# Patient Record
Sex: MALE | Race: BLACK OR AFRICAN AMERICAN | URBAN - METROPOLITAN AREA
[De-identification: names, ages, dates, MRNs, and addresses within clinical notes are randomized per-mention and may not be internally consistent; named-entity substitution may affect disease eponyms.]

---

## 2017-07-09 ENCOUNTER — HOSPITAL ENCOUNTER (EMERGENCY)
Facility: CLINIC | Age: 27
Discharge: HOME | End: 2017-07-09
Attending: EMERGENCY MEDICINE | Admitting: EMERGENCY MEDICINE

## 2017-07-09 ENCOUNTER — APPOINTMENT (OUTPATIENT)
Dept: GENERAL RADIOLOGY | Facility: CLINIC | Age: 27
End: 2017-07-09
Attending: EMERGENCY MEDICINE

## 2017-07-09 VITALS
SYSTOLIC BLOOD PRESSURE: 126 MMHG | RESPIRATION RATE: 16 BRPM | OXYGEN SATURATION: 98 % | DIASTOLIC BLOOD PRESSURE: 82 MMHG | TEMPERATURE: 98.5 F

## 2017-07-09 DIAGNOSIS — S86.302A INJURY OF PERONEAL TENDON OF LEFT FOOT, INITIAL ENCOUNTER: ICD-10-CM

## 2017-07-09 PROCEDURE — 99283 EMERGENCY DEPT VISIT LOW MDM: CPT

## 2017-07-09 PROCEDURE — 25000132 ZZH RX MED GY IP 250 OP 250 PS 637: Performed by: EMERGENCY MEDICINE

## 2017-07-09 PROCEDURE — 73610 X-RAY EXAM OF ANKLE: CPT | Mod: LT

## 2017-07-09 RX ORDER — IBUPROFEN 600 MG/1
600 TABLET, FILM COATED ORAL ONCE
Status: COMPLETED | OUTPATIENT
Start: 2017-07-09 | End: 2017-07-09

## 2017-07-09 RX ORDER — IBUPROFEN 200 MG
600 TABLET ORAL EVERY 8 HOURS PRN
Qty: 30 TABLET | Refills: 0 | Status: SHIPPED | OUTPATIENT
Start: 2017-07-09

## 2017-07-09 RX ADMIN — IBUPROFEN 600 MG: 600 TABLET ORAL at 07:16

## 2017-07-09 ASSESSMENT — ENCOUNTER SYMPTOMS: JOINT SWELLING: 1

## 2017-07-09 NOTE — LETTER
[facility] EMERGENCY DEPARTMENT  [address]  [facility] [address]  [phone]    2017    Kenan Barroso  No address on file.  There are no phone numbers on file.    : 1990      To Whom it may concern:    Kenan Barroso was seen in our Emergency Department today, 2017.  I expect his condition to improve over the next 7 days.  He may return to work/school when approved by his orthopedic doctor.    Sincerely,        Jam Becerril MD

## 2017-07-09 NOTE — ED AVS SNAPSHOT
[facility] Emergency Department    [address]    [facility] [address]    Phone:  [phone]    Fax:  [phone]                                       Kenan Barroso   MRN: [id number]    Department:  [facility] Emergency Department   Date of Visit:  7/9/2017           After Visit Summary Signature Page     I have received my discharge instructions, and my questions have been answered. I have discussed any challenges I see with this plan with the nurse or doctor.    ..........................................................................................................................................  Patient/Patient Representative Signature      ..........................................................................................................................................  Patient Representative Print Name and Relationship to Patient    ..................................................               ................................................  Date                                            Time    ..........................................................................................................................................  Reviewed by Signature/Title    ...................................................              ..............................................  Date                                                            Time

## 2017-07-09 NOTE — DISCHARGE INSTRUCTIONS
You have a Peroneal tendon dislocation or the left ankle.  You will need to keep the ortho boot on continuously- no walking without it, and crutches for non weightbearing until further evaluated by the orthopedist.  Call the ortho office to whom we referred you to in these discharge instructions and set up an appointment to see them this week.  Tell them that the ER doctor wanted you to be seen this week.  Until then, hand in your work release slip to be off this week to elevate, ice, and rest your ankle and foot.  Use Ibuprofen 600 mg every 8 hours with some food to prevent an upset stomach.  You may return if you develop new or worsening symptoms prior to seeing your orthopedist.

## 2017-07-09 NOTE — ED AVS SNAPSHOT
[facility] Emergency Department    [address]    [facility] [address]    Phone:  [phone]    Fax:  [phone]                                       Kenan Barroso   MRN: [id number]    Department:  [facility] Emergency Department   Date of Visit:  7/9/2017           Patient Information     Date Of Birth          1990        Your diagnoses for this visit were:     Injury of peroneal tendon of left foot, initial encounter        You were seen by Jam Becerril MD.      Follow-up Information     Schedule an appointment as soon as possible for a visit with Keyshawn Hamilton MD.    Specialty:  Orthopedics    Contact information:    [facility] ORTHOPEDICS  [address]  [facility] [address] [phone]          Discharge Instructions       You have a Peroneal tendon dislocation or the left ankle.  You will need to keep the ortho boot on continuously- no walking without it, and crutches for non weightbearing until further evaluated by the orthopedist.  Call the ortho office to whom we referred you to in these discharge instructions and set up an appointment to see them this week.  Tell them that the ER doctor wanted you to be seen this week.  Until then, hand in your work release slip to be off this week to elevate, ice, and rest your ankle and foot.  Use Ibuprofen 600 mg every 8 hours with some food to prevent an upset stomach.  You may return if you develop new or worsening symptoms prior to seeing your orthopedist.    24 Hour Appointment Hotline       To make an appointment at any [facility] clinic, call [phone] ([phone]). If you don't have a family doctor or clinic, we will help you find one. [facility] clinics are conveniently located to serve the needs of you and your family.             Review of your medicines      START taking        Dose / Directions Last dose taken    ibuprofen 200 MG tablet   Commonly known as:   ADVIL/MOTRIN   Dose:  600 mg   Quantity:  30 tablet        Take 3 tablets (600 mg) by mouth every 8 hours as needed for mild pain   Refills:  0                Prescriptions were sent or printed at these locations (1 Prescription)                   Other Prescriptions                Printed at Department/Unit printer (1 of 1)         ibuprofen (ADVIL/MOTRIN) 200 MG tablet                Procedures and tests performed during your visit     Ankle XR, G/E 3 views, left      Orders Needing Specimen Collection     None      Pending Results     No orders found from 7/7/2017 to 7/10/2017.            Pending Culture Results     No orders found from 7/7/2017 to 7/10/2017.            Pending Results Instructions     If you had any lab results that were not finalized at the time of your Discharge, you can call the ED Lab Result RN at [phone]. You will be contacted by this team for any positive Lab results or changes in treatment. The nurses are available 7 days a week from 10A to 6:30P.  You can leave a message 24 hours per day and they will return your call.        Test Results From Your Hospital Stay        7/9/2017  7:43 AM      Narrative     XR ANKLE LT G/E 3 VW 7/9/2017 7:34 AM    HISTORY: Trauma.    COMPARISON: None.        Impression     IMPRESSION: No evidence of acute fracture or malalignment. Ankle  mortise is intact. Mild soft tissue swelling overlying the lateral  malleolus.    MAXIME RAMON MD                Clinical Quality Measure: Blood Pressure Screening     Your blood pressure was checked while you were in the emergency department today. The last reading we obtained was  BP: 126/82 . Please read the guidelines below about what these numbers mean and what you should do about them.  If your systolic blood pressure (the top number) is less than 120 and your diastolic blood pressure (the bottom number) is less than 80, then your blood pressure is normal. There is nothing more that you need to do about it.  If  "your systolic blood pressure (the top number) is 120-139 or your diastolic blood pressure (the bottom number) is 80-89, your blood pressure may be higher than it should be. You should have your blood pressure rechecked within a year by a primary care provider.  If your systolic blood pressure (the top number) is 140 or greater or your diastolic blood pressure (the bottom number) is 90 or greater, you may have high blood pressure. High blood pressure is treatable, but if left untreated over time it can put you at risk for heart attack, stroke, or kidney failure. You should have your blood pressure rechecked by a primary care provider within the next 4 weeks.  If your provider in the emergency department today gave you specific instructions to follow-up with your doctor or provider even sooner than that, you should follow that instruction and not wait for up to 4 weeks for your follow-up visit.        Thank you for choosing [facility]       Thank you for choosing [facility] for your care. Our goal is always to provide you with excellent care. Hearing back from our patients is one way we can continue to improve our services. Please take a few minutes to complete the written survey that you may receive in the mail after you visit with us. Thank you!        Symptom.ly Information     Symptom.ly lets you send messages to your doctor, view your test results, renew your prescriptions, schedule appointments and more. To sign up, go to [email or web address] . Click on \"Log in\" on the left side of the screen, which will take you to the Welcome page. Then click on \"Sign up Now\" on the right side of the page.     You will be asked to enter the access code listed below, as well as some personal information. Please follow the directions to create your username and password.     Your access code is: [email or web address]  Expires: 10/7/2017  8:02 AM     Your access code will  in 90 days. If you need help or a new code, please call your " [facility] or [phone].        Care EveryWhere ID     This is your Care EveryWhere ID. This could be used by other organizations to access your [facility] medical records  [id number]        Equal Access to Services     CARINE ALANIZ : Gretchen [place], waaxda luqadaha, qaybta kaalmada darlin, anna hernandez. So [facility] [phone].    ATENCIÓN: Si habla español, tiene a etienne disposición servicios gratuitos de asistencia lingüística. Llame al [phone].    We comply with applicable federal civil rights laws and [place] laws. We do not discriminate on the basis of race, color, national origin, age, disability sex, sexual orientation or gender identity.            After Visit Summary       This is your record. Keep this with you and show to your community pharmacist(s) and doctor(s) at your next visit.

## 2017-07-09 NOTE — ED PROVIDER NOTES
History     Chief Complaint:  Ankle pain     HPI   Kenan Barroso is a 26 year old male who presents with left ankle pain. The patient states that he was hit by a palate sharon at work sustaining an injury to his left ankle yesterday at 2100 with slight swelling. The patient states he feels a clicking sensation with walking and certain rotations, prompting his ED visit.     Allergies:  NKDA     Medications:    The patient is currently on no regular medications.      Past Medical History:    The patient denies any significant past medical history.    Past Surgical History:    The patient does not have any pertinent past surgical history  Family History:    No past pertinent family history.    Social History:  Presents with his significant other.  Current Every Day Smoker - 0.25 ppd  Negative for alcohol use.      Review of Systems   Musculoskeletal: Positive for gait problem and joint swelling.   All other systems reviewed and are negative.    Physical Exam   First Vitals:  BP: 126/82  Heart Rate: 60  Temp: 98.5  F (36.9  C)  Resp: 16  SpO2: 98 %    Physical Exam  General: Generally comfortable lying down, discomfort with walking and movement of left ankle.  HEENT:   The scalp and head appear normal    The pupils are equal, round, and reactive to light    Extraocular muscles are intact.    The nose is normal.    The oropharynx is normal.      Uvula is in the midline.  There is no peritonsillar abscess.  Neck:  Normal range of motion.    Lungs:  Clear.      No rales, no wheezing.      There is no tachypnea.  Non-labored.  Cardiac: Regular rate.      Normal S1 and S2.      No S3 or S4.      No pathological murmur.      No pericardial rub.  Abdomen: Soft. No distension. No tympani. No rebound. Non-tender.  Lymph: No anterior or posterior cervical lymphadenopathy noted.  MS:  Normal tone.      With plantar flexion of the left foot, the peroneus longus tendon comes out of its   sheath     Peroneus longus tendon came  out of it sheath clicking along distal fibula along   left ankle  Neuro:  Normal mentation.  No focal motor or sensory changes.      Speech normal.  Psych:  Awake.     Alert.      Normal affect.      Appropriate interactions.  Skin:  No rash.      No lesions.       Emergency Department Course     Imaging:  Radiographic findings were communicated with the patient who voiced understanding of the findings.  XR Ankle, Left, G/E 3 views:   No evidence of acute fracture or malalignment. Ankle  mortise is intact. Mild soft tissue swelling overlying the lateral  malleolus. As per radiology.     Interventions:  0716 Advil/Motrin, 600 mg, PO     Emergency Department Course:  Nursing notes and vitals reviewed. I performed an exam of the patient as documented above.     Medicine administered as documented above.     The patient was sent for a left foot XR while in the emergency department, findings above.     0720 I rechecked the patient and discussed the results of their workup thus far.      Findings and plan explained to the Patient. Patient discharged home with instructions regarding supportive care, medications, and reasons to return. The importance of close follow-up was reviewed. The patient was prescribed Advil.       Impression & Plan      Medical Decision Making:  Kenan Barroso is a 26 year old male who dislocated his peroneus tendon left ankle foot area. It audible and visible snaps and moves with plantar flexion. This was caused by direct impact by a palate sharon at work. He works at [employer].    The patient has moderately severe pain on plantar flexion as this tendon moves. He also has moderate swelling present at that area and is very tender to palpation at that area.   We're going to immobilize his foot and ankle in an ortho air boot. He was instructed to keep this on at all times when he is up right and walking. We should have him, at least over the next several days, non weight bearing with  crutches. He has been instructed, over the next 48-72 hours, to elevate this above his heart. Ice this area 4 times a day for 15 minutes and take the ibuprofen as prescribed. He was given a work note to be off this week so that he may follow up to have this treated appropriately by [facility] who is on call for the ED. He has been given this information and instructed in this regard. Also, I gave him appropriate doses of ibuprofen on a prescription.     Impression: Acute traumatic injury to the left peroneus tendon with dislocation.     Diagnosis:    ICD-10-CM   1. Injury of peroneal tendon of left foot, initial encounter S86.302A       Disposition:  discharged to home    Discharge Medications:  New Prescriptions    IBUPROFEN (ADVIL/MOTRIN) 200 MG TABLET    Take 3 tablets (600 mg) by mouth every 8 hours as needed for mild pain     I, Fariha Rangel, am serving as a scribe on 7/9/2017 at 7:06 AM to personally document services performed by Jam Becerril MD based on my observations and the provider's statements to me.      Fariha Rangel  7/9/2017   [facility] EMERGENCY DEPARTMENT       Jam Becerril MD  07/09/17 0846

## 2017-07-09 NOTE — ED NOTES
"Patient reports being injured at work, palate hit back of L ankle. Reports \" clicking sensation\"  "

## 2017-08-08 ENCOUNTER — HOSPITAL ENCOUNTER (EMERGENCY)
Facility: CLINIC | Age: 27
Discharge: HOME | End: 2017-08-08
Attending: INTERNAL MEDICINE | Admitting: INTERNAL MEDICINE
Payer: COMMERCIAL

## 2017-08-08 VITALS
OXYGEN SATURATION: 100 % | RESPIRATION RATE: 16 BRPM | DIASTOLIC BLOOD PRESSURE: 84 MMHG | SYSTOLIC BLOOD PRESSURE: 108 MMHG | TEMPERATURE: 98.4 F

## 2017-08-08 PROCEDURE — 99282 EMERGENCY DEPT VISIT SF MDM: CPT

## 2017-08-08 ASSESSMENT — ENCOUNTER SYMPTOMS
WEAKNESS: 0
FEVER: 0
ARTHRALGIAS: 1
NUMBNESS: 0
CHILLS: 0

## 2017-08-08 NOTE — ED AVS SNAPSHOT
[facility] Emergency Department    [address]    [facility] [address]    Phone:  [phone]    Fax:  [phone]                                       Kenan Barroso   MRN: [id number]    Department:  [facility] Emergency Department   Date of Visit:  8/8/2017           Patient Information     Date Of Birth          1990        Your diagnoses for this visit were:     Subluxation of peroneal tendon, left, initial encounter        You were seen by Altagracia Torres MD.      Follow-up Information     Follow up with Jasiel Hanson MD.    Specialty:  Orthopaedic Surgery    Why:  call for appointment this week    Contact information:    [facility] ORTHOPEDICS  [address]  [facility] [address]  [phone]        24 Hour Appointment Hotline       To make an appointment at any [facility] clinic, call [phone] ([phone]). If you don't have a family doctor or clinic, we will help you find one. [facility] clinics are conveniently located to serve the needs of you and your family.             Review of your medicines      Our records show that you are taking the medicines listed below. If these are incorrect, please call your family doctor or clinic.        Dose / Directions Last dose taken    ibuprofen 200 MG tablet   Commonly known as:  ADVIL/MOTRIN   Dose:  600 mg   Quantity:  30 tablet        Take 3 tablets (600 mg) by mouth every 8 hours as needed for mild pain   Refills:  0                Orders Needing Specimen Collection     None      Pending Results     No orders found from 8/6/2017 to 8/9/2017.            Pending Culture Results     No orders found from 8/6/2017 to 8/9/2017.            Pending Results Instructions     If you had any lab results that were not finalized at the time of your Discharge, you can call the ED Lab Result RN at [phone]. You will be contacted by this team for any positive Lab results or changes in  treatment. The nurses are available 7 days a week from 10A to 6:30P.  You can leave a message 24 hours per day and they will return your call.        Test Results From Your Hospital Stay               Clinical Quality Measure: Blood Pressure Screening     Your blood pressure was checked while you were in the emergency department today. The last reading we obtained was  BP: 108/84 . Please read the guidelines below about what these numbers mean and what you should do about them.  If your systolic blood pressure (the top number) is less than 120 and your diastolic blood pressure (the bottom number) is less than 80, then your blood pressure is normal. There is nothing more that you need to do about it.  If your systolic blood pressure (the top number) is 120-139 or your diastolic blood pressure (the bottom number) is 80-89, your blood pressure may be higher than it should be. You should have your blood pressure rechecked within a year by a primary care provider.  If your systolic blood pressure (the top number) is 140 or greater or your diastolic blood pressure (the bottom number) is 90 or greater, you may have high blood pressure. High blood pressure is treatable, but if left untreated over time it can put you at risk for heart attack, stroke, or kidney failure. You should have your blood pressure rechecked by a primary care provider within the next 4 weeks.  If your provider in the emergency department today gave you specific instructions to follow-up with your doctor or provider even sooner than that, you should follow that instruction and not wait for up to 4 weeks for your follow-up visit.        Thank you for choosing [facility]       Thank you for choosing [facility] for your care. Our goal is always to provide you with excellent care. Hearing back from our patients is one way we can continue to improve our services. Please take a few minutes to complete the written survey that you may receive in the mail after you  "visit with us. Thank you!        [employer] Information     [employer] lets you send messages to your doctor, view your test results, renew your prescriptions, schedule appointments and more. To sign up, go to [email or web address] . Click on \"Log in\" on the left side of the screen, which will take you to the Welcome page. Then click on \"Sign up Now\" on the right side of the page.     You will be asked to enter the access code listed below, as well as some personal information. Please follow the directions to create your username and password.     Your access code is: [email or web address]  Expires: 10/7/2017  8:02 AM     Your access code will  in 90 days. If you need help or a new code, please call your [facility] clinic or [phone].        Care EveryWhere ID     This is your Care EveryWhere ID. This could be used by other organizations to access your [facility] medical records  [id number]        Equal Access to Services     CARINE ALANIZ : Hadii aad ku hadasho Soelverali, waaxda luqadaha, qaybta kaalmada adeegyaamparo, anna kraus . So [facility] [phone].    ATENCIÓN: Si habla español, tiene a etienne disposición servicios gratuitos de asistencia lingüística. Llame al [phone].    We comply with applicable federal civil rights laws and [place] laws. We do not discriminate on the basis of race, color, national origin, age, disability sex, sexual orientation or gender identity.            After Visit Summary       This is your record. Keep this with you and show to your community pharmacist(s) and doctor(s) at your next visit.                  "

## 2017-08-08 NOTE — ED AVS SNAPSHOT
[facility] Emergency Department    [address]    [facility] [address]    Phone:  [phone]    Fax:  [phone]                                       Kenan Barroso   MRN: [id number]    Department:  [facility] Emergency Department   Date of Visit:  8/8/2017           After Visit Summary Signature Page     I have received my discharge instructions, and my questions have been answered. I have discussed any challenges I see with this plan with the nurse or doctor.    ..........................................................................................................................................  Patient/Patient Representative Signature      ..........................................................................................................................................  Patient Representative Print Name and Relationship to Patient    ..................................................               ................................................  Date                                            Time    ..........................................................................................................................................  Reviewed by Signature/Title    ...................................................              ..............................................  Date                                                            Time

## 2017-08-08 NOTE — LETTER
To Whom it may concern:      Kenan Barroso was seen in our Emergency Department today, 08/08/17.  I expect his condition to improve over the next 2-3 days.  he may return to work/school when improved.    Sincerely,  Altagracia Torres MD

## 2017-08-08 NOTE — ED PROVIDER NOTES
"  History     Chief Complaint:  Ankle Pain    HPI   Kenan Barroso is a 26 year old male who presents with his family to the ED for evaluation of left ankle pain. The patient reports he hit his ankle a month ago with a palate at work. The patient subsequently went to the ED and saw Dr. Becerril where imaging showed nothing remarkable. The patient was diagnosed with injury of peroneal tendon of left foot and discharged with Ibuprofen 200mg.  The patient was then referred to orthopedics who noted that it was just a \"bad bruise.\" The patient is prompted to the ED today because the ankle has not gotten better but worse. The patient notes his tendon randomly pops out. The patient denies any fever, chills, weakness, numbness, or tingling.    XR Ankle, Left, G/E 3 Views, 7/9/2017   Impresson: No evidence of acute fracture or malalignment. Ankle  mortise is intact. Mild soft tissue swelling overlying the lateral  malleolus. As per radiology.    Allergies:  No known drug allergies    Medications:    ibuprofen (ADVIL/MOTRIN) 200 MG tablet    Past Medical History:    The patient does not have any past pertinent medical history.    Past Surgical History:    History reviewed. No pertinent surgical history.    Family History:    History reviewed. No pertinent family history.     Social History:  Smoking status: Current every day smoker  Alcohol use: No  Presents to ED with wife and children  Marital Status:  Single [1]     Review of Systems   Constitutional: Negative for chills and fever.   Musculoskeletal: Positive for arthralgias.   Neurological: Negative for weakness and numbness.   All other systems reviewed and are negative.    Physical Exam     Patient Vitals for the past 24 hrs:   BP Temp Temp src Heart Rate Resp SpO2   08/08/17 1449 108/84 98.4  F (36.9  C) Oral 65 16 100 %     Physical Exam   Constitutional: He is cooperative.   Cardiovascular: Normal pulses.    Musculoskeletal:        Left ankle: Tenderness.   Tender " behind lateral malleolus. No deformity now.    Neurological: He is alert. No sensory deficit.   Skin: No abrasion, no ecchymosis and no laceration noted.       Emergency Department Course     Emergency Department Course:  Past medical records, nursing notes, and vitals reviewed.  1448: I performed an exam of the patient and obtained history, as documented above.    1514: I consulted with Dr. Rivera of orthopedic surgery who recommended Dr. Hanson, ankle and pain specialist.     1517: I rechecked the patient. Findings and plan explained to the Patient and spouse, son and daughter. Patient discharged home with instructions regarding supportive care, medications, and reasons to return. The importance of close follow-up was reviewed.     Impression & Plan      Medical Decision Making:  Kenan Barroso is a 26 year old male who returns to the emergency department because of ongoing symptoms with his left ankle. Similarly to the previous visit, this sound consistent with subluxation of the peroneal tendon, likely an injury to the retinaculum related to his work injury. It sounds like previously orthopedics did a very brief evaluation. As noted, I spoke with Dr. Rivera who advised follow-up with Dr. Hanson, the foot and ankle specialist. Patient may use the CAM walker he has at home as needed, I have given him a note for work, return if problems.     Diagnosis:    ICD-10-CM   1. Subluxation of peroneal tendon, left, initial encounter S93.05XA     Disposition: Patient discharged to home    Sheyla Cortés  8/8/2017   [facility] EMERGENCY DEPARTMENT    I, Sheyla Cortés, am serving as a scribe at 2:48 PM on 8/8/2017 to document services personally performed by Altagracia Torres MD based on my observations and the provider's statements to me.          Altagracia Torres MD  08/08/17 [id number]

## 2017-08-08 NOTE — ED NOTES
"Pt arrives with c/o left ankle pain. States he injured it last month and was seen here for this. Was given a referral to orthopedics, but was told it's \"just a bruise\". Pt states he can \"pop the bone in and out, so clearly it's not a burise\". Ankle swollen, CMS intact. No new injury. ABC intact.   "